# Patient Record
Sex: FEMALE | Race: WHITE | URBAN - METROPOLITAN AREA
[De-identification: names, ages, dates, MRNs, and addresses within clinical notes are randomized per-mention and may not be internally consistent; named-entity substitution may affect disease eponyms.]

---

## 2023-03-14 ENCOUNTER — NURSE NAVIGATOR (OUTPATIENT)
Dept: CASE MANAGEMENT | Age: 78
End: 2023-03-14

## 2023-03-15 NOTE — PROGRESS NOTES
[address]  Breast Navigator Encounter    Name:  Adina Wagner      Age:  78 y.o. Diagnosis:   LEFT breast cancer      Interdisciplinary Team:  Med-Onc:                          Surg-Onc:             Dr. Ladene Riedel:         Plastics:           :     Nurse Navigator:  Shannan Chambers RN, BSN, [facility]    Encounter type:  []Patient Initiated  []Navigator Follow-up []Pre-op  []Post-op  []Check-in Prior to First Treatment []Treatment Modality Change  [x]Initial Navigator Encounter []Other:       Narrative:    Reached out to patient for initial navigator contact. I explained what happens at the first consultation - an exam by the physician, a possible ultrasound, then complete discussion of surgical options and other treatment that may be considered. I encouraged the patient to bring  someone with her to this appointment. Discussed that a breast MRI has been ordered by Dr. Jorge Hood, and is the next step in her work-up. She told me that she had had an MRI last night at [facility]. I asked who ordered this, and she did not know. Discussed FH, what happens at the appointment, etc.  When I started talking about the appointment, she asked me why she had to see another surgeon? Said that she had already seen Dr. Allison rBock, had her MRI and was getting ready to schedule surgery. I told her I was very sorry, that she did not need to see Dr. Jorge Hood unless she wanted a second opinion. I told her that someone probably forgot to cancel her appt. She was very appreciative of the call. I provided my information and told her if she wanted a second opinion to call me back. Otherwise, I wished her luck with her upcoming treatment.           Shannan Chambers RN, BSN, [facility]  Oncology Breast Navigator     [address]  [address]  W: [phone]  F: [phone]  [email or web address]  Good Help to Those in [facility]